# Patient Record
Sex: MALE | Race: BLACK OR AFRICAN AMERICAN | URBAN - METROPOLITAN AREA
[De-identification: names, ages, dates, MRNs, and addresses within clinical notes are randomized per-mention and may not be internally consistent; named-entity substitution may affect disease eponyms.]

---

## 2021-01-23 ENCOUNTER — IMMUNIZATIONS (OUTPATIENT)
Dept: FAMILY MEDICINE CLINIC | Facility: HOSPITAL | Age: 26
End: 2021-01-23

## 2021-01-23 DIAGNOSIS — Z23 ENCOUNTER FOR IMMUNIZATION: Primary | ICD-10-CM

## 2021-01-23 PROCEDURE — 0001A SARS-COV-2 / COVID-19 MRNA VACCINE (PFIZER-BIONTECH) 30 MCG: CPT

## 2021-01-23 PROCEDURE — 91300 SARS-COV-2 / COVID-19 MRNA VACCINE (PFIZER-BIONTECH) 30 MCG: CPT

## 2021-02-13 ENCOUNTER — IMMUNIZATIONS (OUTPATIENT)
Dept: FAMILY MEDICINE CLINIC | Facility: HOSPITAL | Age: 26
End: 2021-02-13

## 2021-02-13 DIAGNOSIS — Z23 ENCOUNTER FOR IMMUNIZATION: Primary | ICD-10-CM

## 2021-02-13 PROCEDURE — 91300 SARS-COV-2 / COVID-19 MRNA VACCINE (PFIZER-BIONTECH) 30 MCG: CPT

## 2021-02-13 PROCEDURE — 0002A SARS-COV-2 / COVID-19 MRNA VACCINE (PFIZER-BIONTECH) 30 MCG: CPT

## 2024-01-09 ENCOUNTER — OFFICE VISIT (OUTPATIENT)
Dept: INTERNAL MEDICINE CLINIC | Age: 29
End: 2024-01-09

## 2024-01-09 ENCOUNTER — APPOINTMENT (OUTPATIENT)
Dept: LAB | Age: 29
End: 2024-01-09
Payer: COMMERCIAL

## 2024-01-09 VITALS
TEMPERATURE: 98.8 F | BODY MASS INDEX: 28.84 KG/M2 | SYSTOLIC BLOOD PRESSURE: 128 MMHG | DIASTOLIC BLOOD PRESSURE: 78 MMHG | WEIGHT: 206 LBS | HEIGHT: 71 IN | HEART RATE: 84 BPM | OXYGEN SATURATION: 98 %

## 2024-01-09 DIAGNOSIS — Z00.00 ANNUAL PHYSICAL EXAM: Primary | ICD-10-CM

## 2024-01-09 DIAGNOSIS — Z11.4 SCREENING FOR HIV (HUMAN IMMUNODEFICIENCY VIRUS): ICD-10-CM

## 2024-01-09 DIAGNOSIS — Z11.59 NEED FOR HEPATITIS C SCREENING TEST: ICD-10-CM

## 2024-01-09 DIAGNOSIS — Z13.228 SCREENING FOR METABOLIC DISORDER: ICD-10-CM

## 2024-01-09 DIAGNOSIS — Z13.220 SCREENING FOR LIPID DISORDERS: ICD-10-CM

## 2024-01-09 LAB
ALBUMIN SERPL BCP-MCNC: 4.6 G/DL (ref 3.5–5)
ALP SERPL-CCNC: 83 U/L (ref 34–104)
ALT SERPL W P-5'-P-CCNC: 42 U/L (ref 7–52)
ANION GAP SERPL CALCULATED.3IONS-SCNC: 11 MMOL/L
AST SERPL W P-5'-P-CCNC: 32 U/L (ref 13–39)
BASOPHILS # BLD AUTO: 0.03 THOUSANDS/ÂΜL (ref 0–0.1)
BASOPHILS NFR BLD AUTO: 1 % (ref 0–1)
BILIRUB SERPL-MCNC: 0.31 MG/DL (ref 0.2–1)
BUN SERPL-MCNC: 15 MG/DL (ref 5–25)
CALCIUM SERPL-MCNC: 10 MG/DL (ref 8.4–10.2)
CHLORIDE SERPL-SCNC: 101 MMOL/L (ref 96–108)
CHOLEST SERPL-MCNC: 150 MG/DL
CO2 SERPL-SCNC: 28 MMOL/L (ref 21–32)
CREAT SERPL-MCNC: 1.11 MG/DL (ref 0.6–1.3)
EOSINOPHIL # BLD AUTO: 0.6 THOUSAND/ÂΜL (ref 0–0.61)
EOSINOPHIL NFR BLD AUTO: 10 % (ref 0–6)
ERYTHROCYTE [DISTWIDTH] IN BLOOD BY AUTOMATED COUNT: 13 % (ref 11.6–15.1)
GFR SERPL CREATININE-BSD FRML MDRD: 90 ML/MIN/1.73SQ M
GLUCOSE P FAST SERPL-MCNC: 103 MG/DL (ref 65–99)
HCT VFR BLD AUTO: 51.4 % (ref 36.5–49.3)
HCV AB SER QL: NORMAL
HDLC SERPL-MCNC: 74 MG/DL
HGB BLD-MCNC: 16.3 G/DL (ref 12–17)
HIV 1+2 AB+HIV1 P24 AG SERPL QL IA: NORMAL
HIV 2 AB SERPL QL IA: NORMAL
HIV1 AB SERPL QL IA: NORMAL
HIV1 P24 AG SERPL QL IA: NORMAL
IMM GRANULOCYTES # BLD AUTO: 0.01 THOUSAND/UL (ref 0–0.2)
IMM GRANULOCYTES NFR BLD AUTO: 0 % (ref 0–2)
LDLC SERPL CALC-MCNC: 66 MG/DL (ref 0–100)
LYMPHOCYTES # BLD AUTO: 1.93 THOUSANDS/ÂΜL (ref 0.6–4.47)
LYMPHOCYTES NFR BLD AUTO: 32 % (ref 14–44)
MCH RBC QN AUTO: 27.8 PG (ref 26.8–34.3)
MCHC RBC AUTO-ENTMCNC: 31.7 G/DL (ref 31.4–37.4)
MCV RBC AUTO: 88 FL (ref 82–98)
MONOCYTES # BLD AUTO: 0.41 THOUSAND/ÂΜL (ref 0.17–1.22)
MONOCYTES NFR BLD AUTO: 7 % (ref 4–12)
NEUTROPHILS # BLD AUTO: 2.99 THOUSANDS/ÂΜL (ref 1.85–7.62)
NEUTS SEG NFR BLD AUTO: 50 % (ref 43–75)
NRBC BLD AUTO-RTO: 0 /100 WBCS
PLATELET # BLD AUTO: 217 THOUSANDS/UL (ref 149–390)
PMV BLD AUTO: 9.4 FL (ref 8.9–12.7)
POTASSIUM SERPL-SCNC: 4.4 MMOL/L (ref 3.5–5.3)
PROT SERPL-MCNC: 7.9 G/DL (ref 6.4–8.4)
RBC # BLD AUTO: 5.86 MILLION/UL (ref 3.88–5.62)
SODIUM SERPL-SCNC: 140 MMOL/L (ref 135–147)
TRIGL SERPL-MCNC: 52 MG/DL
TSH SERPL DL<=0.05 MIU/L-ACNC: 1.56 UIU/ML (ref 0.45–4.5)
WBC # BLD AUTO: 5.97 THOUSAND/UL (ref 4.31–10.16)

## 2024-01-09 PROCEDURE — 86803 HEPATITIS C AB TEST: CPT

## 2024-01-09 PROCEDURE — 80053 COMPREHEN METABOLIC PANEL: CPT

## 2024-01-09 PROCEDURE — 87389 HIV-1 AG W/HIV-1&-2 AB AG IA: CPT

## 2024-01-09 PROCEDURE — 80061 LIPID PANEL: CPT

## 2024-01-09 PROCEDURE — 99385 PREV VISIT NEW AGE 18-39: CPT

## 2024-01-09 PROCEDURE — 36415 COLL VENOUS BLD VENIPUNCTURE: CPT

## 2024-01-09 PROCEDURE — 84443 ASSAY THYROID STIM HORMONE: CPT

## 2024-01-09 PROCEDURE — 85025 COMPLETE CBC W/AUTO DIFF WBC: CPT

## 2024-01-09 NOTE — PROGRESS NOTES
ADULT ANNUAL PHYSICAL  [facility]IMARY C[facility] [place]    NAME: Gui Early  AGE: 28 y.o. SEX: male  : 1995     DATE: 2024     Assessment and Plan:     Problem List Items Addressed This Visit    None  Visit Diagnoses       Annual physical exam    -  Primary    New patient establishing care, no concerns  PMH, meds, allergies, F Hx reviewed  Discussed healthy diet and exercise  Routine labs ordered  UTD on screenings    Screening for HIV (human immunodeficiency virus)        Relevant Orders    HIV 1/2 AG/AB w Reflex [facility] for 2 yr old and above    Need for hepatitis C screening test        Relevant Orders    Hepatitis C Antibody    Screening for lipid disorders        Relevant Orders    Lipid Panel with Direct LDL reflex    Screening for metabolic disorder        Relevant Orders    Comprehensive metabolic panel    TSH, 3rd generation with Free T4 reflex    CBC and differential            Immunizations and preventive care screenings were discussed with patient today. Appropriate education was printed on patient's after visit summary.    Counseling:  Alcohol/drug use: discussed moderation in alcohol intake, the recommendations for healthy alcohol use, and avoidance of illicit drug use.  Dental Health: discussed importance of regular tooth brushing, flossing, and dental visits.  Injury prevention: discussed safety/seat belts, safety helmets, smoke detectors, carbon dioxide detectors, and smoking near bedding or upholstery.  Exercise: the importance of regular exercise/physical activity was discussed. Recommend exercise 3-5 times per week for at least 30 minutes.       Depression Screening and Follow-up Plan: Patient was screened for depression during today's encounter. They screened negative with a PHQ-2 score of 0.        Return in about 6 months (around 2024) for Next scheduled follow up.     Chief Complaint:     Chief Complaint   Patient presents with     Establish Care     Patient is new patient here to establish care      History of Present Illness:     Adult Annual Physical   Patient here for a comprehensive physical exam. The patient reports no problems.    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 Denies any problems with PMH. Interesting in routine labs    Diet and Physical Activity  Diet/Nutrition: well balanced diet, limited junk food, consuming 3-5 servings of fruits/vegetables daily, adequate fiber intake, and adequate whole grain intake.   Exercise: moderate cardiovascular exercise, strength training exercises, and 5-7 times a week on average.      Depression Screening  PHQ-2/9 Depression Screening    Little interest or pleasure in doing things: 0 - not at all  Feeling down, depressed, or hopeless: 0 - not at all  PHQ-2 Score: 0  PHQ-2 Interpretation: Negative depression screen       General Health  Sleep: sleeps well and gets 7-8 hours of sleep on average.   Hearing: normal - bilateral.  Vision: no vision problems.   Dental: regular dental visits, brushes teeth twice daily, and flosses teeth occasionally.       Advanced Care Planning  Do you have an advanced directive? no  Do you have a durable medical power of ? no     Review of Systems:     Review of Systems   Constitutional:  Negative for chills, fatigue and fever.   HENT:  Negative for congestion, ear pain, postnasal drip, rhinorrhea, sinus pressure, sore throat and trouble swallowing.    Eyes:  Negative for pain and visual disturbance.   Respiratory:  Negative for cough, chest tightness, shortness of breath and wheezing.    Cardiovascular:  Negative for chest pain, palpitations and leg swelling.   Gastrointestinal:  Negative for abdominal pain, nausea and vomiting.   Genitourinary:  Negative for difficulty urinating, dysuria and hematuria.   Musculoskeletal:  Negative for arthralgias and back pain.   Skin:  Negative for color change, rash and wound.   Neurological:  Negative for dizziness,  weakness, light-headedness, numbness and headaches.   Psychiatric/Behavioral:  Negative for dysphoric mood and sleep disturbance. The patient is not nervous/anxious.    All other systems reviewed and are negative.     Past Medical History:     History reviewed. No pertinent past medical history.   Past Surgical History:     Past Surgical History:   Procedure Laterality Date    FL VCUG VOIDING URETHROCYSTOGRAM  08/06/2014    KNEE CARTILAGE SURGERY Right       Social History:     Social History     Socioeconomic History    Marital status: Single     Spouse name: None    Number of children: None    Years of education: None    Highest education level: None   Occupational History    None   Tobacco Use    Smoking status: Never     Passive exposure: Never    Smokeless tobacco: Never   Vaping Use    Vaping status: Never Used   Substance and Sexual Activity    Alcohol use: Yes     Alcohol/week: 1.0 standard drink of alcohol     Types: 1 Standard drinks or equivalent per week     Comment: social - 2 per month - wine or beer    Drug use: Never    Sexual activity: Yes     Partners: Female     Birth control/protection: Condom Male   Other Topics Concern    None   Social History Narrative    None     Social Determinants of Health     Financial Resource Strain: Not on file   Food Insecurity: Not on file   Transportation Needs: Not on file   Physical Activity: Not on file   Stress: Not on file   Social Connections: Not on file   Intimate Partner Violence: Not on file   Housing Stability: Not on file      Family History:     Family History   Problem Relation Age of Onset    Diabetes Father     Cancer Sister       Current Medications:     No current outpatient medications on file.     No current facility-administered medications for this visit.      Allergies:     Allergies   Allergen Reactions    Shellfish-Derived Products - Food Allergy Itching      Physical Exam:     /78 (BP Location: Right arm, Patient Position: Sitting,  "Cuff Size: Large)   Pulse 84   Temp 98.8 °F (37.1 °C) (Temporal)   Ht 5' 10.67\" (1.795 m)   Wt 93.4 kg (206 lb)   SpO2 98%   BMI 29.00 kg/m²     Physical Exam  Vitals and nursing note reviewed.   Constitutional:       General: He is not in acute distress.     Appearance: Normal appearance. He is not ill-appearing.   HENT:      Head: Normocephalic and atraumatic.      Right Ear: External ear normal.      Left Ear: External ear normal.      Nose: Nose normal. No rhinorrhea.      Mouth/Throat:      Mouth: Mucous membranes are moist.      Pharynx: Oropharynx is clear. No oropharyngeal exudate or posterior oropharyngeal erythema.   Eyes:      General: No scleral icterus.     Extraocular Movements: Extraocular movements intact.      Conjunctiva/sclera: Conjunctivae normal.      Pupils: Pupils are equal, round, and reactive to light.   Cardiovascular:      Rate and Rhythm: Normal rate and regular rhythm.      Heart sounds: Normal heart sounds. No murmur heard.     No friction rub. No gallop.   Pulmonary:      Effort: Pulmonary effort is normal. No respiratory distress.      Breath sounds: Normal breath sounds. No wheezing, rhonchi or rales.   Chest:      Chest wall: No tenderness.   Abdominal:      Palpations: Abdomen is soft.      Tenderness: There is no abdominal tenderness.   Musculoskeletal:      Cervical back: Normal range of motion. No tenderness.      Right lower leg: No edema.      Left lower leg: No edema.   Skin:     General: Skin is warm and dry.      Coloration: Skin is not pale.      Findings: No erythema, lesion or rash.   Neurological:      General: No focal deficit present.      Mental Status: He is alert and oriented to person, place, and time. Mental status is at baseline.      Motor: No weakness.      Coordination: Coordination normal.   Psychiatric:         Mood and Affect: Mood normal.         Behavior: Behavior normal.          Birgit Aaron PA-C   [facility] [place]    "

## 2024-01-10 ENCOUNTER — APPOINTMENT (OUTPATIENT)
Dept: LAB | Age: 29
End: 2024-01-10
Payer: COMMERCIAL

## 2024-01-10 ENCOUNTER — TELEPHONE (OUTPATIENT)
Dept: INTERNAL MEDICINE CLINIC | Facility: OTHER | Age: 29
End: 2024-01-10

## 2024-01-10 DIAGNOSIS — Z72.51 UNPROTECTED SEXUAL INTERCOURSE: ICD-10-CM

## 2024-01-10 DIAGNOSIS — Z72.51 UNPROTECTED SEXUAL INTERCOURSE: Primary | ICD-10-CM

## 2024-01-10 PROCEDURE — 87661 TRICHOMONAS VAGINALIS AMPLIF: CPT

## 2024-01-10 PROCEDURE — 87563 M. GENITALIUM AMP PROBE: CPT

## 2024-01-10 PROCEDURE — 86780 TREPONEMA PALLIDUM: CPT

## 2024-01-10 PROCEDURE — 87591 N.GONORRHOEAE DNA AMP PROB: CPT

## 2024-01-10 PROCEDURE — 86695 HERPES SIMPLEX TYPE 1 TEST: CPT

## 2024-01-10 PROCEDURE — 36415 COLL VENOUS BLD VENIPUNCTURE: CPT

## 2024-01-10 PROCEDURE — 86696 HERPES SIMPLEX TYPE 2 TEST: CPT

## 2024-01-10 PROCEDURE — 87491 CHLMYD TRACH DNA AMP PROBE: CPT

## 2024-01-10 NOTE — TELEPHONE ENCOUNTER
Patient called regarding test results and would like STD testing  Reviewed results with patient via phone   Will place orders for STD testing  Denies urinary frequency, dysuria, hematuria  Denies penile or oral lesions, penile discharge at this time

## 2024-01-11 LAB
C TRACH DNA SPEC QL NAA+PROBE: NEGATIVE
M GENITALIUM DNA SPEC QL NAA+PROBE: POSITIVE
N GONORRHOEA DNA SPEC QL NAA+PROBE: NEGATIVE
T VAGINALIS DNA SPEC QL NAA+PROBE: NEGATIVE
TREPONEMA PALLIDUM IGG+IGM AB [PRESENCE] IN SERUM OR PLASMA BY IMMUNOASSAY: NORMAL

## 2024-01-12 DIAGNOSIS — A49.3 INFECTION DUE TO MYCOPLASMA GENITALIUM: Primary | ICD-10-CM

## 2024-01-12 LAB
HSV1 IGG SER IA-ACNC: 59.7 INDEX (ref 0–0.9)
HSV2 IGG SER IA-ACNC: 1.36 INDEX (ref 0–0.9)

## 2024-01-12 RX ORDER — DOXYCYCLINE HYCLATE 100 MG/1
100 CAPSULE ORAL EVERY 12 HOURS SCHEDULED
Qty: 14 CAPSULE | Refills: 0 | Status: SHIPPED | OUTPATIENT
Start: 2024-01-12 | End: 2024-01-19

## 2024-02-09 ENCOUNTER — TELEPHONE (OUTPATIENT)
Dept: INTERNAL MEDICINE CLINIC | Facility: OTHER | Age: 29
End: 2024-02-09

## 2024-04-19 ENCOUNTER — TELEMEDICINE (OUTPATIENT)
Dept: INTERNAL MEDICINE CLINIC | Age: 29
End: 2024-04-19

## 2024-04-19 DIAGNOSIS — A64 STD (MALE): ICD-10-CM

## 2024-04-19 DIAGNOSIS — A60.02 HERPES SIMPLEX INFECTION OF OTHER SITE OF MALE GENITAL ORGAN: Primary | ICD-10-CM

## 2024-04-19 DIAGNOSIS — R36.9 PENILE DISCHARGE: ICD-10-CM

## 2024-04-19 DIAGNOSIS — Z11.3 SCREENING FOR STD (SEXUALLY TRANSMITTED DISEASE): ICD-10-CM

## 2024-04-19 PROBLEM — A60.00 HERPES GENITALIS: Status: ACTIVE | Noted: 2024-04-19

## 2024-04-19 PROCEDURE — 99214 OFFICE O/P EST MOD 30 MIN: CPT | Performed by: INTERNAL MEDICINE

## 2024-04-19 RX ORDER — VALACYCLOVIR HYDROCHLORIDE 1 G/1
1000 TABLET, FILM COATED ORAL DAILY
Qty: 5 TABLET | Refills: 0 | Status: SHIPPED | OUTPATIENT
Start: 2024-04-19 | End: 2024-04-24

## 2024-04-19 NOTE — PROGRESS NOTES
Virtual Regular Visit    Verification of patient location:    Patient is located at Home in the following state in which I hold an active license [place]      Assessment/Plan:    Herpes genitalis infection  -Will prescribe valacyclovir 1000 mg daily for 5 days for recurrence of herpes genitalis  -Patient should avoid sexual intercourse while vesicles present and use protection when he engages in sexual intercourse    Penile discharge  -Patient was insistent that he wants a prescription for doxycycline for his infection  -I explained to him that since he developed a new discharge after 3 months there is no way he can be sure that it is actually the same infection that he has especially since he is sexually active.  -Will order GC chlamydia, genital mycoplasma profile  -He was counseled to follow-up with his PCP ASAP  -He was counseled on the importance of having his partner treated as well  -He requested a prescription for his girlfriend but I explained to him that I cannot prescribe a medication for his partner if she is not a patient of this practice.  She needs to see her PCP.  -If mycoplasma is positive, patient should be treated with 7 days of doxycycline followed by 7 days of moxifloxacin.            Problem List Items Addressed This Visit        Genitourinary    Herpes genitalis - Primary    Relevant Medications    valACYclovir (VALTREX) 1,000 mg tablet    Other Relevant Orders    RPR-Syphilis Screening (Total Syphilis IGG/IGM)       Urinary    Penile discharge    Relevant Orders    Chlamydia/GC amplified DNA by PCR    RPR-Syphilis Screening (Total Syphilis IGG/IGM)    Chlamydia/Gonococcus/Genital Mycoplasma Profile, SAMUEL, Urine       Other    Screening for STD (sexually transmitted disease)    Relevant Orders    Chlamydia/GC amplified DNA by PCR    RPR-Syphilis Screening (Total Syphilis IGG/IGM)    Chlamydia/Gonococcus/Genital Mycoplasma Profile, SAMUEL, Urine   Other Visit Diagnoses     STD (male)                    Reason for visit is   Chief Complaint   Patient presents with   • antibiotics     pt is requesting abx due to genital symptoms. Started about a week ago. possibly STD related        Encounter provider Hannah Pedersen DO    Provider located at [facility]  [address]  [address]  [place] [address]      Recent Visits  No visits were found meeting these conditions.  Showing recent visits within past 7 days and meeting all other requirements  Today's Visits  Date Type Provider Dept   04/19/24 Telemedicine Hannah Pedersen DO [facility]   Showing today's visits and meeting all other requirements  Future Appointments  No visits were found meeting these conditions.  Showing future appointments within next 150 days and meeting all other requirements       The patient was identified by name and date of birth. Gui Early was informed that this is a telemedicine visit and that the visit is being conducted through the [employer] platform. He agrees to proceed..  My office door was closed. No one else was in the room.  He acknowledged consent and understanding of privacy and security of the video platform. The patient has agreed to participate and understands they can discontinue the visit at any time.    Patient is aware this is a billable service.     Subjective  Gui Early is a 28 y.o. male  .      HPI   Pt presents via telemedicine visit with complaints of a rash on his groin that started 2 weeks ago.  Patient states that the rash looks like a group of pimples/vesicles that are together and is painful, located on the left side of his groin.  He states that he has had this before   Had testing done a few months ago by his PCP.   No recent intercourse with a new partner  Sexually active with an old partner and last intercourse was a week and a half ago right before the rash started.  He states that he used protection   He  denies any swollen glands   He states that he sometimes has a yellowish discharge from his penis and the last time was a few days ago.  Of note, patient had std testing done almost 3 months ago that was positive for mycoplasma and hepres 1 and 2.  He tells me that the reason he scheduled a visit today is so that he can have a refill of the antibiotic he was given for his infection.  Of note, he was prescribed doxycycline for the mycoplasma infection.  He also tells me that he missed a day of his medications   States that he is monogamous with one partner and states that she was treated.  He also asks if he can get antibiotics for his girlfriend.     History reviewed. No pertinent past medical history.    Past Surgical History:   Procedure Laterality Date   • [place] VCUG VOIDING URETHROCYSTOGRAM  08/06/2014   • KNEE CARTILAGE SURGERY Right        Current Outpatient Medications   Medication Sig Dispense Refill   • valACYclovir (VALTREX) 1,000 mg tablet Take 1 tablet (1,000 mg total) by mouth daily for 5 days 5 tablet 0     No current facility-administered medications for this visit.        Allergies   Allergen Reactions   • Shellfish-Derived Products - Food Allergy Itching       Review of Systems   Constitutional:  Negative for activity change, chills, fatigue, fever and unexpected weight change.   HENT:  Positive for congestion (on and off for a few days) and rhinorrhea. Negative for ear pain, postnasal drip, sinus pressure and sore throat.    Eyes:  Negative for pain.   Respiratory:  Negative for cough, choking, chest tightness, shortness of breath and wheezing.    Cardiovascular:  Negative for chest pain, palpitations and leg swelling.   Gastrointestinal:  Negative for abdominal pain, constipation, diarrhea, nausea and vomiting.   Genitourinary:  Positive for dysuria (sometimes), frequency (sometimes) and penile discharge. Negative for hematuria and urgency.   Musculoskeletal:  Negative for arthralgias, back pain, gait  problem, joint swelling, myalgias and neck stiffness.   Skin:  Positive for rash (genital rash). Negative for pallor.   Neurological:  Negative for dizziness, tremors, seizures, syncope, light-headedness and headaches.   Hematological:  Negative for adenopathy.   Psychiatric/Behavioral:  Negative for behavioral problems.        Video Exam    Vitals:       Physical Exam  Constitutional:       General: He is not in acute distress.     Appearance: Normal appearance. He is not ill-appearing or toxic-appearing.   HENT:      Head: Normocephalic and atraumatic.      Mouth/Throat:      Mouth: Mucous membranes are moist.   Eyes:      General: No scleral icterus.        Right eye: No discharge.         Left eye: No discharge.   Pulmonary:      Effort: Pulmonary effort is normal. No respiratory distress (No conversational dyspnea).   Abdominal:      Tenderness: There is no abdominal tenderness.   Musculoskeletal:      Cervical back: Normal range of motion.   Skin:     Coloration: Skin is not jaundiced.   Neurological:      Mental Status: He is alert and oriented to person, place, and time.   Psychiatric:         Behavior: Behavior normal.          Visit Time  Total Visit Duration: 20

## 2024-05-19 PROBLEM — Z11.3 SCREENING FOR STD (SEXUALLY TRANSMITTED DISEASE): Status: RESOLVED | Noted: 2024-04-19 | Resolved: 2024-05-19

## 2024-06-21 ENCOUNTER — TELEPHONE (OUTPATIENT)
Age: 29
End: 2024-06-21

## 2024-06-21 NOTE — TELEPHONE ENCOUNTER
Unsure why this appointment could not be scheduled by access center.     Called patient. Had to leave message on machine.     Needs to know when, what facility, when the stitches are due out and where on the body are the stitches.      Would  need to have record of stitching procedure.

## 2024-06-21 NOTE — TELEPHONE ENCOUNTER
Pt returning phone call; scheduled for [facility] 6/27. Please see the following requested info:    Pt slipped, fell, and cut knee and foot on tempered glass while in the [place] about 6 days ago 6/15.     Was advised to follow up and have stitches removed in 10-15 days.

## 2024-06-27 ENCOUNTER — HOSPITAL ENCOUNTER (EMERGENCY)
Facility: HOSPITAL | Age: 29
Discharge: HOME | End: 2024-06-27
Attending: EMERGENCY MEDICINE

## 2024-06-27 ENCOUNTER — OFFICE VISIT (OUTPATIENT)
Dept: INTERNAL MEDICINE CLINIC | Age: 29
End: 2024-06-27

## 2024-06-27 VITALS
OXYGEN SATURATION: 100 % | RESPIRATION RATE: 18 BRPM | DIASTOLIC BLOOD PRESSURE: 73 MMHG | HEART RATE: 79 BPM | TEMPERATURE: 98.2 F | BODY MASS INDEX: 28.86 KG/M2 | SYSTOLIC BLOOD PRESSURE: 122 MMHG | WEIGHT: 205 LBS

## 2024-06-27 VITALS
TEMPERATURE: 97 F | OXYGEN SATURATION: 99 % | DIASTOLIC BLOOD PRESSURE: 78 MMHG | SYSTOLIC BLOOD PRESSURE: 118 MMHG | HEART RATE: 75 BPM

## 2024-06-27 DIAGNOSIS — S81.812D LACERATION OF LEFT LOWER EXTREMITY, SUBSEQUENT ENCOUNTER: Primary | ICD-10-CM

## 2024-06-27 DIAGNOSIS — Z48.02 VISIT FOR SUTURE REMOVAL: Primary | ICD-10-CM

## 2024-06-27 PROBLEM — S81.812A LACERATION OF LEFT LOWER EXTREMITY: Status: ACTIVE | Noted: 2024-06-27

## 2024-06-27 PROCEDURE — 15853 REMOVAL SUTR/STAPL XREQ ANES: CPT | Performed by: EMERGENCY MEDICINE

## 2024-06-27 PROCEDURE — 99282 EMERGENCY DEPT VISIT SF MDM: CPT

## 2024-06-27 PROCEDURE — 99213 OFFICE O/P EST LOW 20 MIN: CPT | Performed by: INTERNAL MEDICINE

## 2024-06-27 PROCEDURE — 99283 EMERGENCY DEPT VISIT LOW MDM: CPT | Performed by: EMERGENCY MEDICINE

## 2024-06-27 NOTE — ASSESSMENT & PLAN NOTE
Wound over left knee and left foot healing well with no sign of infection.    Try to remove stitches over left knee and 4 stitches were removed but rest of the stitches are buried deep and slight bleeding after manipulation noted.  Left fourth stitches also with a lot of crusted blood and deep in the skin.    Referred patient to ER for stitches removal because not safe to be removed in office.  Patient agreed and he will go to the emergency room right now

## 2024-06-27 NOTE — DISCHARGE INSTRUCTIONS
Please keep your wounds clean and dry use Steri-Strips as directed on open areas.  Return in 5 days for removal of the remaining sutures in your left foot.

## 2024-06-27 NOTE — PROGRESS NOTES
Assessment/Plan:    Laceration of left lower extremity  Wound over left knee and left foot healing well with no sign of infection.    Try to remove stitches over left knee and 4 stitches were removed but rest of the stitches are buried deep and slight bleeding after manipulation noted.  Left fourth stitches also with a lot of crusted blood and deep in the skin.    Referred patient to ER for stitches removal because not safe to be removed in office.  Patient agreed and he will go to the emergency room right now       Diagnoses and all orders for this visit:    Laceration of left lower extremity, subsequent encounter               Subjective:          Patient ID: Gui Early is a 29 y.o. male.    Patient is here for stitches removal from left knee and left foot.  He was visiting [place] and and slipped over glass and have big laceration over left knee and foot.  Stitches was placed over there.  Today he is here for stitches removal        The following portions of the patient's history were reviewed and updated as appropriate: allergies, current medications, past family history, past medical history, past social history, past surgical history, and problem list.    Review of Systems   Constitutional:  Negative for fatigue and fever.   HENT:  Negative for congestion, ear discharge, ear pain, postnasal drip, sinus pressure, sore throat, tinnitus and trouble swallowing.    Eyes:  Negative for discharge, itching and visual disturbance.   Respiratory:  Negative for cough and shortness of breath.    Cardiovascular:  Negative for chest pain and palpitations.   Gastrointestinal:  Negative for abdominal pain, diarrhea, nausea and vomiting.   Endocrine: Negative for cold intolerance and polyuria.   Genitourinary:  Negative for difficulty urinating, dysuria and urgency.   Musculoskeletal:  Negative for arthralgias and neck pain.   Skin:  Negative for rash.        Status post laceration of left knee and left foot.   With stitches   Allergic/Immunologic: Negative for environmental allergies.   Neurological:  Negative for dizziness, weakness and headaches.   Psychiatric/Behavioral:  Negative for agitation. The patient is not nervous/anxious.          History reviewed. No pertinent past medical history.      Current Outpatient Medications:     valACYclovir (VALTREX) 1,000 mg tablet, Take 1 tablet (1,000 mg total) by mouth daily for 5 days, Disp: 5 tablet, Rfl: 0    Allergies   Allergen Reactions    Shellfish-Derived Products - Food Allergy Itching       Social History   Past Surgical History:   Procedure Laterality Date    [place] VCUG VOIDING URETHROCYSTOGRAM  08/06/2014    KNEE CARTILAGE SURGERY Right      Family History   Problem Relation Age of Onset    Diabetes Father     Cancer Sister        Objective:  /78 (BP Location: Left arm, Patient Position: Sitting, Cuff Size: Large)   Pulse 75   Temp (!) 97 °F (36.1 °C) (Temporal)   SpO2 99%   There is no height or weight on file to calculate BMI.     Physical Exam  Constitutional:       General: He is not in acute distress.     Appearance: He is well-developed.   HENT:      Head: Normocephalic.      Right Ear: Ear canal and external ear normal.      Left Ear: Ear canal and external ear normal.      Nose: No rhinorrhea.      Mouth/Throat:      Pharynx: No posterior oropharyngeal erythema.   Eyes:      General: No scleral icterus.     Pupils: Pupils are equal, round, and reactive to light.   Neck:      Thyroid: No thyromegaly.      Trachea: No tracheal deviation.   Cardiovascular:      Rate and Rhythm: Normal rate and regular rhythm.      Heart sounds: Normal heart sounds. No murmur heard.  Pulmonary:      Effort: Pulmonary effort is normal. No respiratory distress.      Breath sounds: Normal breath sounds.   Chest:      Chest wall: No tenderness.   Abdominal:      General: Bowel sounds are normal.      Palpations: Abdomen is soft. There is no mass.      Tenderness: There is no  abdominal tenderness.   Musculoskeletal:         General: Normal range of motion.      Cervical back: Normal range of motion and neck supple. No rigidity.      Right lower leg: No edema.      Left lower leg: No edema.   Lymphadenopathy:      Cervical: No cervical adenopathy.   Skin:     General: Skin is warm.      Findings: No erythema or rash.      Comments: Laceration over left knee status post stitches and wound is healing well with no sign of infection.  Also wound over left foot with stitches healing well.   Neurological:      Mental Status: He is alert and oriented to person, place, and time.      Cranial Nerves: No cranial nerve deficit.   Psychiatric:         Mood and Affect: Mood normal.         Behavior: Behavior normal.

## 2024-06-28 NOTE — ED PROVIDER NOTES
History  Chief Complaint   Patient presents with    Suture / Staple Removal     Here for suture removal, sutures placed 6/15 to left knee and left foot, area non tender, no drainage reported, no fevers     Patient referred in by PCP due to extensive laceration sustained while on vacation in the [facility] [place].  Patient states that he had an accident where he sustained a laceration to both his left knee as well as his left foot by falling through a plate glass.  Patient was seen by a physician and while in the [place] had imaging and sutures placed at that time.  PCP referred patient to ED due to difficulty removing sutures in office concern for possible buried sutures and reevaluation of wound.  Patient offers no complaints at this time states wounds have been healing well.  Denies fevers drainage or any other concerns.      History provided by:  Patient   used: No    Suture / Staple Removal   The sutures were placed 11 to 14 days ago. Treatments since wound repair include antibiotic ointment use. There has been no drainage from the wound. There is no redness present. There is no swelling present. There is no pain present. He has no difficulty moving the affected extremity or digit.       Prior to Admission Medications   Prescriptions Last Dose Informant Patient Reported? Taking?   valACYclovir (VALTREX) 1,000 mg tablet   No No   Sig: Take 1 tablet (1,000 mg total) by mouth daily for 5 days      Facility-Administered Medications: None       History reviewed. No pertinent past medical history.    Past Surgical History:   Procedure Laterality Date    [place] VCUG VOIDING URETHROCYSTOGRAM  08/06/2014    KNEE CARTILAGE SURGERY Right        Family History   Problem Relation Age of Onset    Diabetes Father     Cancer Sister      I have reviewed and agree with the history as documented.    E-Cigarette/Vaping    E-Cigarette Use Never User      E-Cigarette/Vaping Substances    Nicotine No      THC No     CBD No     Flavoring No     Other No     Unknown No      Social History     Tobacco Use    Smoking status: Never     Passive exposure: Never    Smokeless tobacco: Never   Vaping Use    Vaping status: Never Used   Substance Use Topics    Alcohol use: Yes     Alcohol/week: 1.0 standard drink of alcohol     Types: 1 Standard drinks or equivalent per week     Comment: social - 2 per month - wine or beer    Drug use: Never       Review of Systems   Constitutional:  Negative for activity change, chills, fatigue and fever.   HENT:  Negative for sore throat, trouble swallowing and voice change.    Eyes:  Negative for pain and visual disturbance.   Respiratory:  Negative for choking, shortness of breath and wheezing.    Cardiovascular:  Negative for chest pain and leg swelling.   Gastrointestinal:  Negative for abdominal distention, abdominal pain, diarrhea and vomiting.   Endocrine: Negative for polydipsia and polyuria.   Genitourinary:  Negative for difficulty urinating, dysuria and flank pain.   Musculoskeletal:  Negative for neck stiffness.   Skin:  Negative for color change and rash.   Neurological:  Negative for dizziness, speech difficulty and headaches.   Hematological:  Does not bruise/bleed easily.   Psychiatric/Behavioral:  Negative for agitation, behavioral problems, hallucinations and suicidal ideas.        Physical Exam  Physical Exam  Musculoskeletal:         General: Signs of injury present.        Legs:    Skin:     Findings: Lesion present.         Vital Signs  ED Triage Vitals [06/27/24 1027]   Temperature Pulse Respirations Blood Pressure SpO2   98.2 °F (36.8 °C) 79 18 122/73 100 %      Temp Source Heart Rate Source Patient Position - Orthostatic VS BP Location FiO2 (%)   Oral Monitor Sitting Right arm --      Pain Score       --           Vitals:    06/27/24 1027   BP: 122/73   Pulse: 79   Patient Position - Orthostatic VS: Sitting         Visual Acuity      ED Medications  Medications - No  data to display    Diagnostic Studies  Results Reviewed       None                   No orders to display              Procedures  Procedures         ED Course       All sutures placed in knee lacerations were removed under direct visualization by me patient tolerated procedure well had no immediate complications.  Wound edges were reinforced with Steri-Strips and patient was counseled to keep wound covered at all times until remaining tissue granulates in.    Upon evaluation of left foot wound, 2 sutures were removed on the dorsal aspect of wound.  Wound edges do not appear to be approximated completely at this time I expressed to the patient that I am concerned that there is risk of wound dehiscence if I remove the sutures in his foot at this time.  Steri-Strips were applied to areas.  I have advised patient to come back in 5 days for reevaluation of his wound to see if wound has healed enough for suture removal.  Patient was provided additional Steri-Strip material in the emergency department to help stabilize wounds told to keep wound clean and dry at all times.  Return precautions for signs of infection were discussed with patient     Patient verbalized understanding of all instructions and follow-up plan.                                  Medical Decision Making  Patient presents with visit for suture removal after sustaining multiple complex lacerations while on vacation.  Differential diagnosis: Suture removal, at risk for wound dehiscence, doubt abscess doubt cellulitis    Will attempt to move sutures under direct visualization will reinforce wound margins with Steri-Strips    Problems Addressed:  Visit for suture removal: acute illness or injury             Disposition  Final diagnoses:   Visit for suture removal     Time reflects when diagnosis was documented in both MDM as applicable and the Disposition within this note       Time User Action Codes Description Comment    6/27/2024 11:20 AM Sherwin Chavez  Add [Z48.02] Visit for suture removal           ED Disposition       ED Disposition   Discharge    Condition   Stable    Date/Time   Thu Jun 27, 2024 11:21 AM    Comment   Gui Early discharge to home/self care.                   Follow-up Information       Follow up With Specialties Details Why Contact Info Additional Information    [facility] Emergency Department Emergency Medicine In 5 days For suture removal of your left foot [address] [facility] [id number] [phone] [facility], [address], [place], [address]   [phone]            Discharge Medication List as of 6/27/2024 11:21 AM        CONTINUE these medications which have NOT CHANGED    Details   valACYclovir (VALTREX) 1,000 mg tablet Take 1 tablet (1,000 mg total) by mouth daily for 5 days, Starting Fri 4/19/2024, Until Wed 4/24/2024, Normal             No discharge procedures on file.    PDMP Review       None            ED Provider  Electronically Signed by             Sherwin Chavez MD  06/28/24 0616

## 2024-07-04 ENCOUNTER — HOSPITAL ENCOUNTER (EMERGENCY)
Facility: HOSPITAL | Age: 29
Discharge: HOME | End: 2024-07-04
Attending: EMERGENCY MEDICINE

## 2024-07-04 VITALS
TEMPERATURE: 98.6 F | RESPIRATION RATE: 18 BRPM | OXYGEN SATURATION: 98 % | SYSTOLIC BLOOD PRESSURE: 160 MMHG | HEART RATE: 82 BPM | DIASTOLIC BLOOD PRESSURE: 76 MMHG

## 2024-07-04 DIAGNOSIS — Z51.89 VISIT FOR WOUND CHECK: ICD-10-CM

## 2024-07-04 DIAGNOSIS — Z48.02 VISIT FOR SUTURE REMOVAL: Primary | ICD-10-CM

## 2024-07-04 PROCEDURE — 99282 EMERGENCY DEPT VISIT SF MDM: CPT | Performed by: EMERGENCY MEDICINE

## 2024-07-04 PROCEDURE — 99282 EMERGENCY DEPT VISIT SF MDM: CPT

## 2024-07-04 NOTE — ED ATTENDING ATTESTATION
7/4/2024  I, Keren Kimble MD, saw and evaluated the patient. I have discussed the patient with the resident/non-physician practitioner and agree with the resident's/non-physician practitioner's findings, Plan of Care, and MDM as documented in the resident's/non-physician practitioner's note, except where noted. All available labs and Radiology studies were reviewed.  I was present for key portions of any procedure(s) performed by the resident/non-physician practitioner and I was immediately available to provide assistance.       At this point I agree with the current assessment done in the Emergency Department.  I have conducted an independent evaluation of this patient a history and physical is as follows:  Pt here for recheck of sutures and removal Pt has sutures now for 3 weeks no pain no redness PE; alert nad l foot with good nv Pt has sutures clean no redness minimal swelling of dorsum MDM: will remove sutures  ED Course         Critical Care Time  Procedures

## 2024-07-04 NOTE — DISCHARGE INSTRUCTIONS
Please follow-up with your primary care provider.  Continue to keep the area clean.  You can use Steri-Strips/Band-Aid/gauze/antibiotic ointment for care.  Wound will slowly heal on its own over time.  Continue to monitor for signs of infection-see attached.  Return to the ED if you have significant bleeding that cannot be stopped.

## 2024-07-04 NOTE — ED PROVIDER NOTES
History  Chief Complaint   Patient presents with    Suture / Staple Removal     Pt here for suture removal of L foot      Patient is a 29-year-old male presenting for suture removal.  Patient had laceration to the left knee and left foot in the [place] about 3 weeks ago.  He presented to the ED recently for suture removal and it appeared that his wound would dehisce on the foot if sutures were removed.  Patient was told to come back for suture removal which she did today.  Patient denies any drainage from the area or signs of infection.  Pain has significantly improved.  Patient has been keeping the area clean and using some antibiotic ointment and gauze for protection.  Patient has no complaints at this time.        Prior to Admission Medications   Prescriptions Last Dose Informant Patient Reported? Taking?   valACYclovir (VALTREX) 1,000 mg tablet   No No   Sig: Take 1 tablet (1,000 mg total) by mouth daily for 5 days      Facility-Administered Medications: None       History reviewed. No pertinent past medical history.    Past Surgical History:   Procedure Laterality Date    [place] VCUG VOIDING URETHROCYSTOGRAM  08/06/2014    KNEE CARTILAGE SURGERY Right        Family History   Problem Relation Age of Onset    Diabetes Father     Cancer Sister      I have reviewed and agree with the history as documented.    E-Cigarette/Vaping    E-Cigarette Use Never User      E-Cigarette/Vaping Substances    Nicotine No     THC No     CBD No     Flavoring No     Other No     Unknown No      Social History     Tobacco Use    Smoking status: Never     Passive exposure: Never    Smokeless tobacco: Never   Vaping Use    Vaping status: Never Used   Substance Use Topics    Alcohol use: Yes     Alcohol/week: 1.0 standard drink of alcohol     Types: 1 Standard drinks or equivalent per week     Comment: social - 2 per month - wine or beer    Drug use: Never        Review of Systems    Physical Exam  ED Triage Vitals [07/04/24  1603]   Temperature Pulse Respirations Blood Pressure SpO2   98.6 °F (37 °C) 82 18 160/76 98 %      Temp Source Heart Rate Source Patient Position - Orthostatic VS BP Location FiO2 (%)   Oral Monitor Sitting Left arm --      Pain Score       No Pain             Orthostatic Vital Signs  Vitals:    07/04/24 1603   BP: 160/76   Pulse: 82   Patient Position - Orthostatic VS: Sitting       Physical Exam  Vitals and nursing note reviewed.   Constitutional:       General: He is not in acute distress.     Appearance: Normal appearance. He is not ill-appearing, toxic-appearing or diaphoretic.   HENT:      Head: Normocephalic and atraumatic.      Mouth/Throat:      Mouth: Mucous membranes are moist.   Cardiovascular:      Rate and Rhythm: Normal rate.   Pulmonary:      Effort: Pulmonary effort is normal.   Skin:     General: Skin is warm and dry.      Findings: Lesion (Left knee lacerations without sutures well-healing.  10 cm laceration to lateral aspect of left foot extending through the lateral aspect of the little toe and onto the plantar surface with sutures in place, well-healing and no signs of infection.) present.   Neurological:      General: No focal deficit present.      Mental Status: He is alert and oriented to person, place, and time.         ED Medications  Medications - No data to display    Diagnostic Studies  Results Reviewed       None                   No orders to display         Procedures  Suture removal    Date/Time: 7/4/2024 4:57 PM    Performed by: Moody Roper MD  Authorized by: Moody Roper MD  Universal Protocol:  Consent: Verbal consent obtained.  Risks and benefits: risks, benefits and alternatives were discussed  Consent given by: patient  Patient understanding: patient states understanding of the procedure being performed  Patient consent: the patient's understanding of the procedure matches consent given  Procedure consent: procedure consent matches procedure scheduled  Required  items: required blood products, implants, devices, and special equipment available  Patient identity confirmed: verbally with patient and arm band      Patient location:  ED  Location:     Laterality:  Left    Location:  Lower extremity    Lower extremity location:  Foot    Foot location:  L foot  Procedure details:     Tools used:  Suture removal kit    Wound appearance:  No sign(s) of infection    Number of sutures removed:  25  Post-procedure details:     Post-removal:  Steri-Strips applied and Band-Aid applied    Patient tolerance of procedure:  Tolerated well, no immediate complications        ED Course                                       Medical Decision Making  Patient is a 29-year-old male presenting for suture removal and wound check.    Patient had been previously seen but there was concern for possible wound dehiscence.  Sutures will be removed carefully and left in if necessary.  Sutures were removed with some difficulty due to extensive scabbing, however the wound appears to be healing well and does not require sutures anymore.  There are a few sections of skin flaps due to suturing technique and thickness of the skin that will need to heal/slough on their own.  Patient was instructed to keep the areas covered and to be gentle while cleaning to avoid discomfort and reopening of the wound.    Patient was cleared for discharge with PCP follow-up and return precautions.          Disposition  Final diagnoses:   Visit for suture removal   Visit for wound check     Time reflects when diagnosis was documented in both MDM as applicable and the Disposition within this note       Time User Action Codes Description Comment    7/4/2024  4:47 PM Moody Roper Add [Z48.02] Visit for suture removal     7/4/2024  4:47 PM Moody Roper Add [Z51.89] Visit for wound check           ED Disposition       ED Disposition   Discharge    Condition   Stable    Date/Time   Thu Jul 4, 2024  4:49 PM    Comment   Gui  Sharath discharge to home/self care.                   Follow-up Information       Follow up With Specialties Details Why Contact Info Additional Information    Birgit Aaron PA-C Internal Medicine, Physician Assistant   [address]   [address]  [facility] [address] [phone]       [facility] Emergency Department Emergency Medicine   [address] [facility] [address] [phone] Critical access hospital Emergency Department, [address], [place], [address] [phone]            Discharge Medication List as of 7/4/2024  4:50 PM        CONTINUE these medications which have NOT CHANGED    Details   valACYclovir (VALTREX) 1,000 mg tablet Take 1 tablet (1,000 mg total) by mouth daily for 5 days, Starting Fri 4/19/2024, Until Wed 4/24/2024, Normal           No discharge procedures on file.    PDMP Review       None             ED Provider  Attending physically available and evaluated Gui Early. I managed the patient along with the ED Attending.    Electronically Signed by           Moody Roper MD  07/05/24 1136

## 2024-07-27 PROBLEM — S81.812A LACERATION OF LEFT LOWER EXTREMITY: Status: RESOLVED | Noted: 2024-06-27 | Resolved: 2024-07-27

## 2024-09-05 ENCOUNTER — OFFICE VISIT (OUTPATIENT)
Dept: INTERNAL MEDICINE CLINIC | Age: 29
End: 2024-09-05
Payer: COMMERCIAL

## 2024-09-05 VITALS
TEMPERATURE: 98.5 F | DIASTOLIC BLOOD PRESSURE: 82 MMHG | HEART RATE: 76 BPM | SYSTOLIC BLOOD PRESSURE: 120 MMHG | WEIGHT: 185 LBS | BODY MASS INDEX: 25.9 KG/M2 | OXYGEN SATURATION: 98 % | HEIGHT: 71 IN

## 2024-09-05 DIAGNOSIS — L84 CALLUSE: Primary | ICD-10-CM

## 2024-09-05 PROCEDURE — 99213 OFFICE O/P EST LOW 20 MIN: CPT | Performed by: INTERNAL MEDICINE

## 2024-09-05 NOTE — PROGRESS NOTES
Assessment/Plan:    [place]  Referred to podiatry for further management.       Diagnoses and all orders for this visit:    [facility]  -     Ambulatory Referral to Podiatry; Future               Subjective:          Patient ID: Gui Early is a 29 y.o. male.    Patient is here for follow-up on laceration of left knee and foot.  Stitches were removed in the emergency room about 6-week ago.  Still there is some thickening/callus of the left side of the left foot.        The following portions of the patient's history were reviewed and updated as appropriate: allergies, current medications, past family history, past medical history, past social history, past surgical history, and problem list.    Review of Systems   Constitutional:  Negative for fatigue and fever.   HENT:  Negative for congestion, ear discharge, ear pain, postnasal drip, sinus pressure, sore throat, tinnitus and trouble swallowing.    Eyes:  Negative for discharge, itching and visual disturbance.   Respiratory:  Negative for cough and shortness of breath.    Cardiovascular:  Negative for chest pain and palpitations.   Gastrointestinal:  Negative for abdominal pain, diarrhea, nausea and vomiting.   Endocrine: Negative for cold intolerance and polyuria.   Genitourinary:  Negative for difficulty urinating, dysuria and urgency.   Musculoskeletal:  Negative for arthralgias and neck pain.   Skin:  Negative for rash.        Callus of left side of left foot   Allergic/Immunologic: Negative for environmental allergies.   Neurological:  Negative for dizziness, weakness and headaches.   Psychiatric/Behavioral:  The patient is not nervous/anxious.          History reviewed. No pertinent past medical history.    No current outpatient medications on file.    Allergies   Allergen Reactions    Shellfish-Derived Products - Food Allergy Itching       Social History   Past Surgical History:   Procedure Laterality Date    FL VCUG VOIDING URETHROCYSTOGRAM  08/06/2014     "KNEE CARTILAGE SURGERY Right      Family History   Problem Relation Age of Onset    Diabetes Father     Cancer Sister        Objective:  /82 (BP Location: Left arm, Patient Position: Sitting, Cuff Size: Adult)   Pulse 76   Temp 98.5 °F (36.9 °C) (Temporal)   Ht 5' 11\" (1.803 m)   Wt 83.9 kg (185 lb)   SpO2 98% Comment: ra  BMI 25.80 kg/m²   Body mass index is 25.8 kg/m².     Physical Exam  Constitutional:       General: He is not in acute distress.     Appearance: He is well-developed.   HENT:      Head: Normocephalic.      Right Ear: External ear normal.      Left Ear: External ear normal.      Nose: No congestion or rhinorrhea.   Eyes:      General: No scleral icterus.     Pupils: Pupils are equal, round, and reactive to light.   Neck:      Thyroid: No thyromegaly.      Trachea: No tracheal deviation.   Cardiovascular:      Rate and Rhythm: Normal rate and regular rhythm.      Heart sounds: Normal heart sounds. No murmur heard.  Pulmonary:      Effort: Pulmonary effort is normal. No respiratory distress.      Breath sounds: Normal breath sounds.   Chest:      Chest wall: No tenderness.   Abdominal:      General: Bowel sounds are normal.      Palpations: Abdomen is soft. There is no mass.      Tenderness: There is no abdominal tenderness.   Musculoskeletal:         General: Normal range of motion.      Cervical back: Normal range of motion and neck supple. No rigidity.      Right lower leg: No edema.      Left lower leg: No edema.   Lymphadenopathy:      Cervical: No cervical adenopathy.   Skin:     General: Skin is warm.      Findings: Lesion present. No erythema or rash.      Comments: Callus over left side of left foot noted   Neurological:      Mental Status: He is alert and oriented to person, place, and time.      Cranial Nerves: No cranial nerve deficit.      Sensory: No sensory deficit.   Psychiatric:         Mood and Affect: Mood normal.         Behavior: Behavior normal.                   "